# Patient Record
Sex: FEMALE
[De-identification: names, ages, dates, MRNs, and addresses within clinical notes are randomized per-mention and may not be internally consistent; named-entity substitution may affect disease eponyms.]

---

## 2018-07-14 ENCOUNTER — CHARTING TRANS (OUTPATIENT)
Dept: OTHER | Age: 28
End: 2018-07-14

## 2018-08-07 ENCOUNTER — OFFICE VISIT (OUTPATIENT)
Dept: OBGYN CLINIC | Facility: CLINIC | Age: 28
End: 2018-08-07
Payer: COMMERCIAL

## 2018-08-07 VITALS
WEIGHT: 191 LBS | HEIGHT: 69 IN | HEART RATE: 96 BPM | BODY MASS INDEX: 28.29 KG/M2 | SYSTOLIC BLOOD PRESSURE: 114 MMHG | DIASTOLIC BLOOD PRESSURE: 64 MMHG

## 2018-08-07 DIAGNOSIS — Z30.432 ENCOUNTER FOR REMOVAL OF INTRAUTERINE CONTRACEPTIVE DEVICE: ICD-10-CM

## 2018-08-07 DIAGNOSIS — Z01.419 WELL WOMAN EXAM WITH ROUTINE GYNECOLOGICAL EXAM: ICD-10-CM

## 2018-08-07 DIAGNOSIS — R10.2 PELVIC PAIN IN FEMALE: Primary | ICD-10-CM

## 2018-08-07 DIAGNOSIS — Z12.4 SCREENING FOR MALIGNANT NEOPLASM OF CERVIX: ICD-10-CM

## 2018-08-07 PROCEDURE — 99385 PREV VISIT NEW AGE 18-39: CPT | Performed by: OBSTETRICS & GYNECOLOGY

## 2018-08-07 PROCEDURE — 87591 N.GONORRHOEAE DNA AMP PROB: CPT | Performed by: OBSTETRICS & GYNECOLOGY

## 2018-08-07 PROCEDURE — 87491 CHLMYD TRACH DNA AMP PROBE: CPT | Performed by: OBSTETRICS & GYNECOLOGY

## 2018-08-07 PROCEDURE — 87624 HPV HI-RISK TYP POOLED RSLT: CPT | Performed by: OBSTETRICS & GYNECOLOGY

## 2018-08-07 PROCEDURE — 88175 CYTOPATH C/V AUTO FLUID REDO: CPT | Performed by: OBSTETRICS & GYNECOLOGY

## 2018-08-07 PROCEDURE — 58301 REMOVE INTRAUTERINE DEVICE: CPT | Performed by: OBSTETRICS & GYNECOLOGY

## 2018-08-07 PROCEDURE — 87625 HPV TYPES 16 & 18 ONLY: CPT | Performed by: OBSTETRICS & GYNECOLOGY

## 2018-08-07 RX ORDER — AMOXICILLIN AND CLAVULANATE POTASSIUM 875; 125 MG/1; MG/1
TABLET, FILM COATED ORAL
Refills: 0 | COMMUNITY
Start: 2018-07-14 | End: 2018-08-07

## 2018-08-07 RX ORDER — DICYCLOMINE HYDROCHLORIDE 10 MG/1
CAPSULE ORAL
Refills: 0 | COMMUNITY
Start: 2018-07-30 | End: 2018-08-07

## 2018-08-07 RX ORDER — ESCITALOPRAM OXALATE 5 MG/1
TABLET ORAL
Refills: 11 | COMMUNITY
Start: 2018-07-14

## 2018-08-07 NOTE — PROGRESS NOTES
IUD Removal     Consent signed. Procedure discussed with the patient in detail including indication, risks, benefits, alternatives and complications.     Pelvic Exam Findings:  Lesion description:  IUD strings seen from cervix    Procedure:  Speculum ute

## 2018-08-07 NOTE — PROGRESS NOTES
Silvana Beltrán is a 32year old female [id number] Patient's last menstrual period was 07/23/2018. Patient presents with:  Wellness Visit  IUD: removal  .     She has had the light let up for 2 years she does not like it.   She complains that her  Social History Narrative   None on file       FAMILY HISTORY:  Family History   Problem Relation Age of Onset   • Heart Disease Father    • Diabetes Father    • Lipids Mother    • Ovarian Cancer Maternal Aunt      No breast cancer  MEDICATIONS:    Curr

## 2018-08-08 LAB
C TRACH DNA SPEC QL NAA+PROBE: NEGATIVE
HPV I/H RISK 1 DNA SPEC QL NAA+PROBE: POSITIVE
N GONORRHOEA DNA SPEC QL NAA+PROBE: NEGATIVE

## 2018-08-09 LAB
HPV16 DNA CVX QL PROBE+SIG AMP: NEGATIVE
HPV18 DNA CVX QL PROBE+SIG AMP: NEGATIVE

## 2018-08-09 NOTE — PROGRESS NOTES
Patient aware of results and recommendations for colposcopy. Appointment schedule.  Patient verbalizes understanding

## 2018-08-16 ENCOUNTER — OFFICE VISIT (OUTPATIENT)
Dept: OBGYN CLINIC | Facility: CLINIC | Age: 28
End: 2018-08-16
Payer: COMMERCIAL

## 2018-08-16 VITALS
BODY MASS INDEX: 27.7 KG/M2 | SYSTOLIC BLOOD PRESSURE: 124 MMHG | WEIGHT: 187 LBS | DIASTOLIC BLOOD PRESSURE: 68 MMHG | TEMPERATURE: 99 F | HEIGHT: 69 IN | HEART RATE: 96 BPM

## 2018-08-16 DIAGNOSIS — R87.810 CERVICAL HIGH RISK HUMAN PAPILLOMAVIRUS (HPV) DNA TEST POSITIVE: Primary | ICD-10-CM

## 2018-08-16 DIAGNOSIS — N87.0 MILD DYSPLASIA OF CERVIX: ICD-10-CM

## 2018-08-16 PROCEDURE — 88342 IMHCHEM/IMCYTCHM 1ST ANTB: CPT | Performed by: OBSTETRICS & GYNECOLOGY

## 2018-08-16 PROCEDURE — 88305 TISSUE EXAM BY PATHOLOGIST: CPT | Performed by: OBSTETRICS & GYNECOLOGY

## 2018-08-16 PROCEDURE — 57454 BX/CURETT OF CERVIX W/SCOPE: CPT | Performed by: OBSTETRICS & GYNECOLOGY

## 2018-08-16 RX ORDER — SERTRALINE HYDROCHLORIDE 25 MG/1
TABLET, FILM COATED ORAL
COMMUNITY
Start: 2018-08-14

## 2018-08-16 RX ORDER — DICYCLOMINE HYDROCHLORIDE 10 MG/1
CAPSULE ORAL
COMMUNITY
Start: 2018-07-30

## 2018-08-16 NOTE — PROGRESS NOTES
[facility]  Obstetrics and Gynecology  Colposcopy Procedure Note    Colpo w/Cx Biopsy and ECC    Pregnancy Results: negative from urine test     Consent signed.   Procedure discussed with patient in detail including indication, risk, be

## 2018-08-23 ENCOUNTER — TELEPHONE (OUTPATIENT)
Dept: OBGYN CLINIC | Facility: CLINIC | Age: 28
End: 2018-08-23

## 2018-08-23 NOTE — TELEPHONE ENCOUNTER
Per pt she called to get the results of her colposcopy. Please advise and call pt when results are ready.  Thanks

## 2018-08-23 NOTE — PROGRESS NOTES
Patient aware of results and recommendations for repeat pap smear in 1 year.  Patient verbalizes understanding

## 2018-10-31 VITALS
DIASTOLIC BLOOD PRESSURE: 70 MMHG | HEART RATE: 80 BPM | TEMPERATURE: 98.9 F | RESPIRATION RATE: 18 BRPM | SYSTOLIC BLOOD PRESSURE: 110 MMHG

## 2018-12-28 ENCOUNTER — TELEPHONE (OUTPATIENT)
Dept: OBGYN CLINIC | Facility: CLINIC | Age: 28
End: 2018-12-28

## 2019-01-14 ENCOUNTER — OFFICE VISIT (OUTPATIENT)
Dept: OBGYN CLINIC | Facility: CLINIC | Age: 29
End: 2019-01-14
Payer: COMMERCIAL

## 2019-01-14 VITALS
WEIGHT: 199 LBS | SYSTOLIC BLOOD PRESSURE: 98 MMHG | DIASTOLIC BLOOD PRESSURE: 60 MMHG | BODY MASS INDEX: 29.47 KG/M2 | HEART RATE: 72 BPM | HEIGHT: 69 IN | RESPIRATION RATE: 14 BRPM

## 2019-01-14 DIAGNOSIS — N92.6 IRREGULAR BLEEDING: Primary | ICD-10-CM

## 2019-01-14 PROCEDURE — 99213 OFFICE O/P EST LOW 20 MIN: CPT | Performed by: OBSTETRICS & GYNECOLOGY

## 2019-01-14 RX ORDER — NORETHINDRONE ACETATE AND ETHINYL ESTRADIOL 1MG-20(21)
1 KIT ORAL DAILY
Qty: 1 PACKAGE | Refills: 11 | Status: SHIPPED | OUTPATIENT
Start: 2019-01-14

## 2019-01-14 NOTE — PROGRESS NOTES
Gregory Wilkins is a 25year old female [id number] Patient's last menstrual period was 01/13/2019 (exact date). Patient presents with:  Gyn Problem: Pt wants to change birth control  . Patient has been on LoLoestrin for 6 months, c/o irregular spotting an Not Asked        Blood Transfusions: Not Asked        Caffeine Concern: Yes        Occupational Exposure: Not Asked        Hobby Hazards: Not Asked        Sleep Concern: Not Asked        Stress Concern: Not Asked        Weight Concern: Not Asked        Spe

## 2020-02-12 ENCOUNTER — TELEPHONE (OUTPATIENT)
Dept: SCHEDULING | Age: 30
End: 2020-02-12

## (undated) NOTE — LETTER
Silvana Beltrán, :1990    CONSENT FOR PROCEDURE/SEDATION    1. I authorize the performance upon Silvana Beltrán  the following: Colposcopy    2.  I authorize Dr. Beverly Lazaro MD (and whomever is designated as the doctor’s assistant), t Witness: _________________________________________ Date:___________     Physician Signature: _______________________________ Date:___________

## (undated) NOTE — LETTER
Gregory Wilkins, :1990    CONSENT FOR PROCEDURE/SEDATION    1. I authorize the performance upon Gregory Wilkins  the following: IUD removal    2.  I authorize Dr. Fariba Rosado MD (and whomever is designated as the doctor’s assistant), Witness: _________________________________________ Date:___________     Physician Signature: _______________________________ Date:___________

## (undated) NOTE — LETTER
[facility] Department of OB/GYN  After Care Instructions for Colposcopy/Biopsy      Biopsy Results   You will receive a phone call with your biopsy results in 7 business days. If you have not received your biopsy results in 7 days, please contact our office.   General Garza